# Patient Record
Sex: FEMALE | Race: OTHER
[De-identification: names, ages, dates, MRNs, and addresses within clinical notes are randomized per-mention and may not be internally consistent; named-entity substitution may affect disease eponyms.]

---

## 2017-12-20 NOTE — OPPN
Date/Time of Note


Date/Time of Note


DATE: 12/20/17 


TIME: 18:33





Operative Report


Preoperative Diagnosis


left wrist dorsal ganglion


Postoperative Diagnosis


left wrist dorsal ganglion


Operation/Procedure Performed


excision of left wrist dorsal ganglion


Surgeon


see signature line


First assist


none


Anesthesia:  general


Estimated blood loss:  0 - 10 ml's


Transfusion Required


   none


Specimen


none


Grafts/Implants


none


Complications


none











FRANCESCO CISNEROS Dec 20, 2017 18:33

## 2017-12-20 NOTE — HPN
Date/Time of Note


Date/Time of Note


DATE: 12/20/17 


TIME: 17:29





Interval H&P Admission Note


Pt. seen H&P reviewed:  No system changes











FRANCESCO CISNEROS Dec 20, 2017 17:29

## 2017-12-21 NOTE — OPR
DATE OF OPERATION:  12/20/2017

 

SURGEON:  Israel Allen MD

 

ANESTHESIA:  General.

 

PREOPERATIVE DIAGNOSIS:  Left wrist dorsal ganglion.

 

POSTOPERATIVE DIAGNOSIS:  Left wrist dorsal ganglion.

 

PROCEDURE:  Excision of left wrist dorsal ganglion.

 

OPERATIVE FINDINGS AT SURGERY:  Left wrist dorsal ganglion

arising from the scapholunate interval.

 

INDICATIONS FOR PROCEDURE:  A 27-year-old female with

longstanding left wrist pain with swelling at the dorsum of the

wrist.  She was diagnosed with a dorsal wrist ganglion and failed

conservative management, electing to proceed with surgical

intervention understanding the risks and benefits.

 

OPERATIVE PROCEDURE:  The patient was seen in the preoperative

area and all further questions were answered.  Again, she gave

informed consent understanding risks and benefits.  She was taken

to the operative suite and placed in the supine position.  She

was placed under general anesthesia and tourniquet placed on left

upper extremity.  Ancef 2 g IV given and left upper extremity was

prepped with ChloraPrep stick and draped in usual sterile

fashion.  Esmarch bandage was used to extend the extremity and

tourniquet inflated to 250 mmHg.  A transverse incision over the

dorsal wrist ganglion was utilized with sharp dissection carried

down through skin and subcutaneous tissue.  The 3rd dorsal

compartment was incised and the EPL tendon retracted radially.

The 4th dorsal compartment tendons were retracted ulnarly and the

ganglion cyst was visualized.  The cyst was dissected down

through the capsule into the scapholunate interval and was

dissected around circumferentially.  The cyst was elevated off of

the scapholunate interval with care taken to preserve the

scapholunate ligament.  The dorsal fibers of the scapholunate

ligament were bovied with Bovie electrocautery and the cyst was

completely excised.  Wound was copiously irrigated.  Skin closed

with 4-0 nylon.  Xeroform placed on the wound followed by sterile

gauze, Webril, and a short-arm splint.  Tourniquet deflated after

11 minutes.  The patient was awakened from anesthesia.  She was

taken the postoperative suite in stable condition.  Tolerated the

procedure well without.

 

SPECIMENS:  None.

 

ESTIMATED BLOOD LOSS:  5 mL.

 

COUNTS:  Sponge and needle counts correct.

 

 

 

 

 

TOURNIQUET TIME:  11 minutes.

 

CONDITION ON DISCHARGE:  Stable.

 

 

 

Dictated By:  Israel Allen MD

 

/edson/ak

Job#:  [id number]/Document#:  [id number]

D:  12/21/2017 11:32

T:  12/21/2017 12:40

[facility]

## 2018-06-06 ENCOUNTER — HOSPITAL ENCOUNTER (EMERGENCY)
Dept: HOSPITAL 91 - E/R | Age: 28
Discharge: HOME | End: 2018-06-06
Payer: COMMERCIAL

## 2018-06-06 ENCOUNTER — HOSPITAL ENCOUNTER (EMERGENCY)
Age: 28
Discharge: HOME | End: 2018-06-06

## 2018-06-06 DIAGNOSIS — G56.03: Primary | ICD-10-CM

## 2018-06-06 PROCEDURE — 99284 EMERGENCY DEPT VISIT MOD MDM: CPT

## 2019-01-02 ENCOUNTER — HOSPITAL ENCOUNTER (EMERGENCY)
Dept: HOSPITAL 91 - FTE | Age: 29
Discharge: HOME | End: 2019-01-02
Payer: COMMERCIAL

## 2019-01-02 ENCOUNTER — HOSPITAL ENCOUNTER (EMERGENCY)
Dept: HOSPITAL 10 - FTE | Age: 29
Discharge: HOME | End: 2019-01-02
Payer: COMMERCIAL

## 2019-01-02 VITALS
WEIGHT: 284.62 LBS | HEIGHT: 65 IN | BODY MASS INDEX: 47.42 KG/M2 | WEIGHT: 284.62 LBS | HEIGHT: 65 IN | BODY MASS INDEX: 47.42 KG/M2

## 2019-01-02 VITALS — HEART RATE: 106 BPM | SYSTOLIC BLOOD PRESSURE: 136 MMHG | DIASTOLIC BLOOD PRESSURE: 83 MMHG | RESPIRATION RATE: 20 BRPM

## 2019-01-02 DIAGNOSIS — J06.9: Primary | ICD-10-CM

## 2019-01-02 DIAGNOSIS — J45.909: ICD-10-CM

## 2019-01-02 PROCEDURE — 99284 EMERGENCY DEPT VISIT MOD MDM: CPT

## 2019-01-02 PROCEDURE — 96372 THER/PROPH/DIAG INJ SC/IM: CPT

## 2019-01-02 PROCEDURE — 93005 ELECTROCARDIOGRAM TRACING: CPT

## 2019-01-02 RX ADMIN — KETOROLAC TROMETHAMINE 1 MG: 30 INJECTION, SOLUTION INTRAMUSCULAR at 22:37

## 2019-01-02 RX ADMIN — PROMETHAZINE HYDROCHLORIDE AND CODEINE PHOSPHATE 1 ML: 6.25; 1 SOLUTION ORAL at 22:43

## 2019-01-02 NOTE — ERD
ER Documentation


Chief Complaint


Chief Complaint





mid sternal CP radiating to epigastric. +dry cough congestion today





HPI


28-year-old female presents with midsternal pleuritic chest pain associate with 


cough and low-grade fever starting today.  She denies any right or left-sided 


chest pain, denies any abdominal pain, hemoptysis, sputum.





ROS


All systems reviewed and are negative except as per history of present illness.





Medications


Home Meds


Active Scripts


Ibuprofen* (Motrin*) 600 Mg Tab, 600 MG PO Q6, #15 TAB


   Prov:HARRISON ROJAS MD         1/2/19


Promethazine HCl/Codeine (Prometh-Codein 6.25-10 mg/5 ml) 5 Ml Syrup, 5 ML PO 


TID for 4 Days


   4 ounces


   Prov:HARRISON ROJAS MD         1/2/19


Gabapentin* (Gabapentin*) 300 Mg Capsule, 300 MG PO BID, #60 CAP


   Prov:NADIA MALHOTRA NP         6/6/18


Ibuprofen* (Motrin*) 400 Mg Tab, 400 MG PO Q6H PRN for PAIN AND OR ELEVATED 


TEMP, #30 TAB


   Prov:NADIA MALHOTRA NP         6/6/18


Hydrocodone/Acetaminophen (Norco 5-325 Tablet) 1 Each Tablet, 1 TAB PO Q6H PRN 


for SEVERE PAIN LEVEL 7-10, #20 TAB


   Prov:NADIA MALHOTRA NP         6/6/18





Allergies


Allergies:  


Coded Allergies:  


     Penicillins (Verified  Allergy, Severe, rashes, hives, vomiting, 12/20/17)





PMhx/Soc


History of Surgery:  Yes (lap arlyn)


Anesthesia Reaction:  No


Hx Neurological Disorder:  No


Hx Respiratory Disorders:  Yes (asthma)


Hx Cardiac Disorders:  No


Hx Psychiatric Problems:  No


Hx Miscellaneous Medical Probl:  No


Hx Alcohol Use:  No


Hx Substance Use:  No


Hx Tobacco Use:  No


Smoking Status:  Never smoker





FmHx


Family History:  No diabetes, No coronary disease, No other





Physical Exam


Vitals





Vital Signs


  Date      Temp   Pulse  Resp  B/P (MAP)   Pulse Ox  O2         O2 Flow    FiO2


Time                                                  Delivery   Rate


    1/2/19  100.0    106    20      136/83        97


     19:33                           (100)





Physical Exam


Const:   No acute distress


Head:   Atraumatic 


Eyes:    Normal Conjunctiva


ENT:    Normal External Ears, Nose and Mouth.  TMs and oropharynx normal.


Neck:               Full range of motion. No meningismus.


Resp:   Clear to auscultation bilaterally


Cardio:   Regular rate and rhythm, no murmurs


Abd:    Soft, non tender, non distended. Normal bowel sounds


Skin:   No petechiae or rashes


Back:   No midline or flank tenderness


Ext:    No cyanosis, or edema


Neur:   Awake and alert


Psych:    Normal Mood and Affect


Results 24 hrs





Current Medications


 Medications
   Dose
          Sig/Kennedy
       Start Time
   Status  Last


 (Trade)       Ordered        Route
 PRN     Stop Time              Admin
Dose


                              Reason                                Admin


 Ketorolac
     60 mg          ONCE  STAT
    1/2/19        DC       



Tromethamine
                 IM
            22:25
 1/2/19


 (Toradol)                                   22:26








Procedures/MDM


EKG: 


Rate/Rhythm:       Normal Sinus Rhythm.  Rate equals 97.


QRS, ST, T-waves:    No changes consistent w/ acute ischemia


Impression:      No evidence of ischemia or arrhythmia


Patient has a normal EKG





Resents with 1 day history of low-grade fever, dry cough, pleuritic anterior 


chest pain without signs or symptoms to suggest cardiac chest pain, 


endocarditis,.  Carditis, signs of pneumonia, hypoxemia, rest or distress PE.  


She likely has a viral URI.  She will be treated with promethazine with codeine,


ibuprofen, primary care follow-up and return precautions.  She is given Toradol 


60 mg IM.  She denies pregnancy.  Adult discharge





Departure


Diagnosis:  


   Primary Impression:  


   URI, acute


Condition:  Stable


Patient Instructions:  Uri, Viral, No Abx (Adult)





Additional Instructions:  


Likely viral illness may last 3-5 days.  Recheck for new or worsening symptoms 


with primary care doctor.











HARRISON ROJAS MD              Jan 2, 2019 22:35

## 2019-03-12 ENCOUNTER — HOSPITAL ENCOUNTER (EMERGENCY)
Dept: HOSPITAL 10 - FTE | Age: 29
LOS: 1 days | Discharge: HOME | End: 2019-03-13
Payer: COMMERCIAL

## 2019-03-12 ENCOUNTER — HOSPITAL ENCOUNTER (EMERGENCY)
Dept: HOSPITAL 91 - FTE | Age: 29
LOS: 1 days | Discharge: HOME | End: 2019-03-13
Payer: COMMERCIAL

## 2019-03-12 VITALS
BODY MASS INDEX: 50.16 KG/M2 | WEIGHT: 283.07 LBS | HEIGHT: 63 IN | WEIGHT: 283.07 LBS | HEIGHT: 63 IN | BODY MASS INDEX: 50.16 KG/M2

## 2019-03-12 DIAGNOSIS — R51: Primary | ICD-10-CM

## 2019-03-12 DIAGNOSIS — J45.909: ICD-10-CM

## 2019-03-12 PROCEDURE — 96372 THER/PROPH/DIAG INJ SC/IM: CPT

## 2019-03-12 PROCEDURE — 81025 URINE PREGNANCY TEST: CPT

## 2019-03-12 PROCEDURE — 99284 EMERGENCY DEPT VISIT MOD MDM: CPT

## 2019-03-12 RX ADMIN — KETOROLAC TROMETHAMINE 1 MG: 30 INJECTION, SOLUTION INTRAMUSCULAR at 23:15

## 2019-03-12 NOTE — ERD
ER Documentation


Chief Complaint


Chief Complaint





c/o left sided headache, started today with nausea.





HPI


28-year-old female presents for headache times 1 day.  She states that the 


headache in the left side rated 8 out of 10, described as a throbbing pain.  


There is associated photophobia and nausea.  She took Tylenol 1000 mg at home 


without relief.  She states that she does have a history of headache and that 


her current headache is similar to the ones in the past however it is a little 


bit worse this time.  Past medical history of prediabetes.  Allergy to 


penicillin.





ROS


All systems reviewed and are negative except as per history of present illness.





Medications


Home Meds


Active Scripts


Ondansetron (Ondansetron Odt) 4 Mg Tab.rapdis, 4 MG PO Q6H PRN for NAUSEA AND/OR


VOMITING, #15 TAB


   Prov:IGNACIO GLOVER DO         3/12/19


Ibuprofen* (Motrin*) 600 Mg Tab, 600 MG PO Q6H PRN for PAIN AND OR ELEVATED 


TEMP, #30 TAB


   Prov:IGNACIO GLOVER DO         3/12/19


Butalbital-Acetaminophen-Caffeine* (Fioricet*) -40 Mg Capsule, 1 CAP PO 


Q4H PRN for HEADACHE, #30 CAP


   Prov:IGNACIO GLOVER DO         3/12/19


Ibuprofen* (Motrin*) 600 Mg Tab, 600 MG PO Q6, #15 TAB


   Prov:HARRISON ROJAS MD         1/2/19


Promethazine HCl/Codeine (Prometh-Codein 6.25-10 mg/5 ml) 5 Ml Syrup, 5 ML PO 


TID for 4 Days


   4 ounces


   Prov:HARRISON ROJAS MD         1/2/19


Gabapentin* (Gabapentin*) 300 Mg Capsule, 300 MG PO BID, #60 CAP


   Prov:NADIA MALHOTRA NP         6/6/18


Ibuprofen* (Motrin*) 400 Mg Tab, 400 MG PO Q6H PRN for PAIN AND OR ELEVATED 


TEMP, #30 TAB


   Prov:NADIA MALHOTRA NP         6/6/18


Hydrocodone/Acetaminophen (Norco 5-325 Tablet) 1 Each Tablet, 1 TAB PO Q6H PRN 


for SEVERE PAIN LEVEL 7-10, #20 TAB


   Prov:NADIA MALHOTRA NP         6/6/18





Allergies


Allergies:  


Coded Allergies:  


     Penicillins (Verified  Allergy, Severe, rashes, hives, vomiting, 12/20/17)





PMhx/Soc


History of Surgery:  Yes (lap arlyn)


Anesthesia Reaction:  No


Hx Neurological Disorder:  No


Hx Respiratory Disorders:  Yes (asthma)


Hx Cardiac Disorders:  No


Hx Psychiatric Problems:  No


Hx Miscellaneous Medical Probl:  No


Hx Alcohol Use:  No


Hx Substance Use:  No


Hx Tobacco Use:  No


Smoking Status:  Never smoker





Physical Exam


Vitals





Vital Signs


  Date      Temp   Pulse  Resp  B/P (MAP)   Pulse Ox  O2         O2 Flow    FiO2


Time                                                  Delivery   Rate


   3/12/19  100.4     88    20      139/95        98


     19:12                           (110)





Physical Exam


Const:   No acute distress


Head:   Atraumatic, no temporal area tenderness to palpation 


Eyes:    Normal Conjunctiva, pupils equal, round, reactive to light bilaterally


ENT:    Normal External Ears, bilateral tympanic membrane intact without 


erythema or bulging noted, Nose and Mouth.  No tonsillar swelling or exudate 


noted


Neck:               Full range of motion. No meningismus, no bruits noted


Resp:   Clear to auscultation bilaterally


Cardio:   Regular rate and rhythm, no murmurs, bilateral radial and dorsalis 


pedis pulses intact


Skin:   No petechiae or rashes


Ext:    No cyanosis, or edema, 5 out of 5 muscular bilateral upper and lower 


extremities


Neur:   Awake and alert, bilateral upper and lower extremity sensation intact


Psych:    Normal Mood and Affect


Results 24 hrs





Laboratory Tests


                    Test
                      3/12/19
23:13


                    POC Beta HCG, Qualitative  NEGATIVE





Current Medications


 Medications
   Dose
          Sig/Kennedy
       Start Time
   Status  Last


 (Trade)       Ordered        Route
 PRN     Stop Time              Admin
Dose


                              Reason                                Admin


 Ketorolac
     30 mg          ONCE  STAT
    3/12/19       DC           3/12/19


Tromethamine
                 IM
            22:57
                       23:15



 (Toradol)                                   3/12/19 22:58








Procedures/MDM


Medical Decision Making:





Differential diagnosis includes but not limited to primary headache, 


subarachnoid hemorrhage, meningitis, temporal arteritis, glaucoma, hypertension,


cerebral ischemia, carotid or vertebral arterial dissection, brain tumor.





Patient appeared well on physical examination, nontoxic appearing.


No history of fever.  There is low suspicion for meningitis.


Given patient's age and no temporal area tenderness to palpation, low suspicion 


for temporal arteritis.


Patient has no vision changes and pupils are reactive bilaterally, low suspicion


for glaucoma.


There is also no focal neurologic deficits to suggest a brain tumor.


Patient has normal sensation and muscle strength, low suspicion for cerebral 


ischemia.





Given headache is similar to prior headaches, patient possibly has a primary 


headache.





In the ER patient given Toradol, Zofran


Symptoms improved with treatment.





Patient given prescription for Fioricet, Motrin, Zofran





Patient advised to follow up with PCP in 1-2 days. Patient advised to return to 


ED for new or worsening symptoms. Patient stable on discharge from the ED.











Disclaimer: Inadvertent spelling and grammatical errors are likely due to 


EHR/dictation software use and do not reflect on the overall quality of patient 


care. Also, please note that the electronic time recorded on this note does not 


necessarily reflect the actual time of the patient encounter.





Departure


Diagnosis:  


   Primary Impression:  


   Headache


   Headache type:  unspecified  Headache chronicity pattern:  acute headache  


   Intractability:  not intractable  Qualified Codes:  R51 - Headache


Condition:  Fair


Patient Instructions:  Self-Care for Headaches


Referrals:  


Critical access hospital CLINICS


YOU HAVE RECEIVED A MEDICAL SCREENING EXAM AND THE RESULTS INDICATE THAT YOU DO 


NOT HAVE A CONDITION THAT REQUIRES URGENT TREATMENT IN THE EMERGENCY DEPARTMENT.





FURTHER EVALUATION AND TREATMENT OF YOUR CONDITION CAN WAIT UNTIL YOU ARE SEEN 


IN YOUR DOCTORS OFFICE WITHIN THE NEXT 1-2 DAYS. IT IS YOUR RESPONSIBILITY TO 


MAKE AN APPOINTMENT FOR FOLOW-UP CARE.





IF YOU HAVE A PRIMARY DOCTOR


--you should call your primary doctor and schedule an appointment





IF YOU DO NOT HAVE A PRIMARY DOCTOR YOU CAN CALL OUR PHYSICIAN REFERRAL HOTLINE 


AT


 [phone] 





IF YOU CAN NOT AFFORD TO SEE A PHYSICIAN YOU CAN CHOSE FROM THE FOLLOWING [facility] [phone] [address] [facility]. [facility] [phone] [address] [facility]. [facility] [phone] [address] [facility]. [facility] [phone] [address] [facility]. [facility] [phone] [address] [facility]. [facility]. [phone] [address]





Additional Instructions:  


Call your primary care doctor TOMORROW for an appointment during the next 1-2 


days.See the doctor sooner or return here if your condition worsens before your 


appointment time.











GINACIO GLOVER DO                 Mar 12, 2019 23:58

## 2019-03-13 VITALS — RESPIRATION RATE: 18 BRPM | DIASTOLIC BLOOD PRESSURE: 75 MMHG | HEART RATE: 77 BPM | SYSTOLIC BLOOD PRESSURE: 116 MMHG
